# Patient Record
Sex: FEMALE | HISPANIC OR LATINO | Employment: OTHER | URBAN - METROPOLITAN AREA
[De-identification: names, ages, dates, MRNs, and addresses within clinical notes are randomized per-mention and may not be internally consistent; named-entity substitution may affect disease eponyms.]

---

## 2023-07-13 ENCOUNTER — APPOINTMENT (EMERGENCY)
Dept: RADIOLOGY | Facility: HOSPITAL | Age: 73
End: 2023-07-13
Payer: COMMERCIAL

## 2023-07-13 ENCOUNTER — HOSPITAL ENCOUNTER (EMERGENCY)
Facility: HOSPITAL | Age: 73
Discharge: HOME | End: 2023-07-13
Attending: EMERGENCY MEDICINE
Payer: COMMERCIAL

## 2023-07-13 VITALS
OXYGEN SATURATION: 100 % | HEART RATE: 105 BPM | DIASTOLIC BLOOD PRESSURE: 76 MMHG | RESPIRATION RATE: 15 BRPM | TEMPERATURE: 99.7 F | SYSTOLIC BLOOD PRESSURE: 161 MMHG
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
## 2023-07-13 DIAGNOSIS — S42.309A HUMERUS FRACTURE: ICD-10-CM

## 2023-07-13 PROCEDURE — 99283 EMERGENCY DEPT VISIT LOW MDM: CPT

## 2023-07-13 PROCEDURE — 73030 X-RAY EXAM OF SHOULDER: CPT

## 2023-07-13 RX ORDER — OXYCODONE HYDROCHLORIDE AND ACETAMINOPHEN 5; 325 MG/1; MG/1
1 TABLET ORAL ONCE
Status: COMPLETED | OUTPATIENT
Start: 2023-07-13 | End: 2023-07-13

## 2023-07-13 RX ORDER — ESOMEPRAZOLE MAGNESIUM 40 MG/1
40 CAPSULE, DELAYED RELEASE ORAL
COMMUNITY

## 2023-07-13 RX ORDER — LIDOCAINE 50 MG/G
1 PATCH TOPICAL ONCE
Status: DISCONTINUED | OUTPATIENT
Start: 2023-07-13 | End: 2023-07-13 | Stop reason: HOSPADM

## 2023-07-13 RX ORDER — RASAGILINE 0.5 MG/1
1 TABLET ORAL DAILY
COMMUNITY

## 2023-07-13 RX ORDER — GLIMEPIRIDE 2 MG/1
2 TABLET ORAL
COMMUNITY

## 2023-07-13 RX ORDER — CARBIDOPA/LEVODOPA 25MG-250MG
1 TABLET ORAL 3 TIMES DAILY
COMMUNITY

## 2023-07-13 RX ORDER — OXYCODONE HYDROCHLORIDE AND ACETAMINOPHEN 5; 325 MG/1; MG/1
1 TABLET ORAL EVERY 6 HOURS PRN
Qty: 20 TABLET | Refills: 0 | Status: SHIPPED | OUTPATIENT
Start: 2023-07-13 | End: 2023-07-23

## 2023-07-13 RX ADMIN — LIDOCAINE 1 PATCH: 50 PATCH CUTANEOUS at 19:47

## 2023-07-13 RX ADMIN — OXYCODONE AND ACETAMINOPHEN 1 TABLET: 5; 325 TABLET ORAL at 19:45

## 2023-07-13 NOTE — ED PROVIDER NOTES
History  Chief Complaint   Patient presents with   • Fall     Pt c/o of fall pt was trying to get into car around 1330 and fell on right shoulder pt heard a crack to R shoulders , (-) thinners,(-)headstrike. 4year-old female comes in for evaluation after fall. Patient states she was trying to get in a car earlier today onto her right shoulder. Patient states she heard a crack. Patient denies any head strike or loss of consciousness no blood thinners. Unable to move her arm. History provided by:  Patient and relative   used: Yes    Fall  Mechanism of injury: fall    Injury location:  Shoulder/arm  Shoulder/arm injury location:  R shoulder  Incident location:  Street  Arrived directly from scene: no    Fall:     Fall occurred:  Tripped and walking    Impact surface:  Tuttle    Point of impact: Right side. Entrapped after fall: no    Protective equipment: none    Suspicion of alcohol use: no    Suspicion of drug use: no    Tetanus status:  Up to date  Prior to arrival data:     Bystander interventions:  None    Patient ambulatory at scene: yes      Blood loss:  None    Responsiveness at scene:  Alert    Orientation at scene:  Person, place, situation and time    Loss of consciousness: no      Amnesic to event: no    Associated symptoms: no abdominal pain, no back pain, no chest pain, no seizures and no vomiting        Prior to Admission Medications   Prescriptions Last Dose Informant Patient Reported? Taking?    BIPERIDEN HCL PO 7/13/2023  Yes Yes   Sig: Take 40 mg by mouth   CHOLESTYRAMINE PO 7/13/2023  Yes Yes   Sig: Take by mouth   Ketoprofen POWD 7/13/2023  Yes Yes   Sig: Use   Nutritional Supplements (NUTRA BALANCE DIABETIC/FIBER PO) 7/13/2023  Yes Yes   Sig: Take by mouth   carbidopa-levodopa (SINEMET)  mg per tablet 7/13/2023  Yes Yes   Sig: Take 1 tablet by mouth 3 (three) times a day   esomeprazole (NexIUM) 40 MG capsule 7/13/2023  Yes Yes   Sig: Take 40 mg by mouth every morning before breakfast   glimepiride (AMARYL) 2 mg tablet 7/13/2023  Yes Yes   Sig: Take 2 mg by mouth every morning before breakfast   metFORMIN (GLUCOPHAGE) 1000 MG tablet 7/13/2023  Yes Yes   Sig: Take 1,000 mg by mouth 2 (two) times a day with meals   rasagiline (AZILECT) 0.5 mg 7/13/2023  Yes Yes   Sig: Take 1 mg by mouth daily      Facility-Administered Medications: None       Past Medical History:   Diagnosis Date   • Diabetes mellitus ([address])    • Parkinson's disease ([address])        Past Surgical History:   Procedure Laterality Date   • CHOLECYSTECTOMY     • HYSTERECTOMY         History reviewed. No pertinent family history. I have reviewed and agree with the history as documented. E-Cigarette/Vaping   • E-Cigarette Use Never User      E-Cigarette/Vaping Substances     Social History     Tobacco Use   • Smoking status: Never   • Smokeless tobacco: Never   Vaping Use   • Vaping Use: Never used   Substance Use Topics   • Alcohol use: Not Currently   • Drug use: Never       Review of Systems   Constitutional: Negative for chills and fever. HENT: Negative for ear pain and sore throat. Eyes: Negative for pain and visual disturbance. Respiratory: Negative for cough and shortness of breath. Cardiovascular: Negative for chest pain and palpitations. Gastrointestinal: Negative for abdominal pain and vomiting. Genitourinary: Negative for dysuria and hematuria. Musculoskeletal: Negative for arthralgias and back pain. Skin: Negative for color change and rash. Neurological: Negative for seizures and syncope. All other systems reviewed and are negative. Physical Exam  Physical Exam  Vitals and nursing note reviewed. Constitutional:       General: She is in acute distress. Appearance: She is well-developed. HENT:      Head: Normocephalic and atraumatic. Eyes:      Conjunctiva/sclera: Conjunctivae normal.   Cardiovascular:      Rate and Rhythm: Normal rate and regular rhythm. Heart sounds: No murmur heard. Pulmonary:      Effort: Pulmonary effort is normal. No respiratory distress. Breath sounds: Normal breath sounds. Abdominal:      Palpations: Abdomen is soft. Tenderness: There is no abdominal tenderness. Musculoskeletal:         General: No swelling. Right shoulder: Tenderness and bony tenderness present. Decreased range of motion. Decreased strength. Cervical back: Neck supple. Skin:     General: Skin is warm and dry. Capillary Refill: Capillary refill takes less than 2 seconds. Neurological:      Mental Status: She is alert. Psychiatric:         Mood and Affect: Mood normal.         Vital Signs  ED Triage Vitals   Temperature Pulse Respirations Blood Pressure SpO2   07/13/23 1926 07/13/23 1926 07/13/23 1926 07/13/23 1926 07/13/23 1926   99.7 °F (37.6 °C) 105 15 161/76 100 %      Temp Source Heart Rate Source Patient Position - Orthostatic VS BP Location FiO2 (%)   07/13/23 1926 07/13/23 1926 07/13/23 1926 07/13/23 1926 --   Oral Monitor Lying Left arm       Pain Score       07/13/23 1945       10 - Worst Possible Pain           Vitals:    07/13/23 1926   BP: 161/76   Pulse: 105   Patient Position - Orthostatic VS: Lying         Visual Acuity      ED Medications  Medications   oxyCODONE-acetaminophen (PERCOCET) 5-325 mg per tablet 1 tablet (1 tablet Oral Given 7/13/23 1945)       Diagnostic Studies  Results Reviewed     None                 XR shoulder 2+ views RIGHT    (Results Pending)              Procedures  Procedures         ED Course                                             Medical Decision Making  Darrian diagnosis includes but is not limited to shoulder strain, shoulder contusion shoulder fracture clavicle fracture    Fall, initial encounter: acute illness or injury  Humerus fracture: acute illness or injury  Amount and/or Complexity of Data Reviewed  Radiology: ordered.      Details: Patient has a proximal humerus fracture      Risk  OTC drugs.  Prescription drug management. Risk Details: Discussed with patient they may take Motrin or Tylenol as needed for pain should continue all current medications. I also added an oxycodone. She is to follow-up with orthopedics        Disposition  Final diagnoses:   Fall, initial encounter   Humerus fracture     Time reflects when diagnosis was documented in both MDM as applicable and the Disposition within this note     Time User Action Codes Description Comment    7/13/2023  7:47 PM Valeria Myrick Add [L15. CODV] Fall, initial encounter     7/13/2023  7:47 PM Valeria Myrick Add [J87.673N] Humerus fracture       ED Disposition     ED Disposition   Discharge    Condition   Stable    Date/Time   Thu Jul 13, 2023  7:46 PM    Comment   Jerry Coombs discharge to home/self care.                Follow-up Information     Follow up With Specialties Details Why Contact Info Additional [address] [facility] Orthopedic Surgery Schedule an appointment as soon as possible for a visit   [address] [address] [address] [facility] [address] [facility] [address] [address] [address] [facility] [phone]    [address] [place] [address] Schedule an appointment as soon as possible for a visit   [address] [facility] [phone]  [address], [place], [place], [address]          Discharge Medication List as of 7/13/2023  7:48 PM      START taking these medications    Details   oxyCODONE-acetaminophen (PERCOCET) 5-325 mg per tablet Take 1 tablet by mouth every 6 (six) hours as needed for moderate pain (May use up to two tablets every 6 hours.) for up to 10 days Max Daily Amount: 4 tablets, Starting Thu 7/13/2023, Until Sun 7/23/2023 at 2359, Normal         CONTINUE these medications which have NOT CHANGED    Details   BIPERIDEN HCL PO Take 40 mg by mouth, Historical Med      carbidopa-levodopa (SINEMET)  mg per tablet Take 1 tablet by mouth 3 (three) times a day, Historical Med      CHOLESTYRAMINE PO Take by mouth, Historical Med      esomeprazole (NexIUM) 40 MG capsule Take 40 mg by mouth every morning before breakfast, Historical Med      glimepiride (AMARYL) 2 mg tablet Take 2 mg by mouth every morning before breakfast, Historical Med      Ketoprofen POWD Use, Historical Med      metFORMIN (GLUCOPHAGE) 1000 MG tablet Take 1,000 mg by mouth 2 (two) times a day with meals, Historical Med      Nutritional Supplements (NUTRA BALANCE DIABETIC/FIBER PO) Take by mouth, Historical Med      rasagiline (AZILECT) 0.5 mg Take 1 mg by mouth daily, Historical Med                 PDMP Review     None          ED Provider  Electronically Signed by           Francine Abdi DO  07/14/23 0015

## 2023-07-20 ENCOUNTER — APPOINTMENT (OUTPATIENT)
Dept: RADIOLOGY | Facility: OTHER | Age: 73
End: 2023-07-20

## 2023-07-20 ENCOUNTER — OFFICE VISIT (OUTPATIENT)
Dept: OBGYN CLINIC | Facility: OTHER | Age: 73
End: 2023-07-20

## 2023-07-20 VITALS
BODY MASS INDEX: 24.75 KG/M2 | DIASTOLIC BLOOD PRESSURE: 81 MMHG | HEIGHT: 64 IN | SYSTOLIC BLOOD PRESSURE: 136 MMHG | HEART RATE: 99 BPM | WEIGHT: 145 LBS

## 2023-07-20 DIAGNOSIS — M25.511 ACUTE PAIN OF RIGHT SHOULDER: Primary | ICD-10-CM

## 2023-07-20 DIAGNOSIS — S42.294A OTHER CLOSED NONDISPLACED FRACTURE OF PROXIMAL END OF RIGHT HUMERUS, INITIAL ENCOUNTER: ICD-10-CM

## 2023-07-20 DIAGNOSIS — Z78.9 NON-ENGLISH SPEAKING PATIENT: ICD-10-CM

## 2023-07-20 DIAGNOSIS — Z59.89 DOES NOT HAVE HEALTH INSURANCE: ICD-10-CM

## 2023-07-20 DIAGNOSIS — M25.511 ACUTE PAIN OF RIGHT SHOULDER: ICD-10-CM

## 2023-07-20 PROCEDURE — 73030 X-RAY EXAM OF SHOULDER: CPT

## 2023-07-20 PROCEDURE — 99203 OFFICE O/P NEW LOW 30 MIN: CPT | Performed by: PHYSICIAN ASSISTANT

## 2023-07-20 SDOH — ECONOMIC STABILITY - INCOME SECURITY: OTHER PROBLEMS RELATED TO HOUSING AND ECONOMIC CIRCUMSTANCES: Z59.89

## 2023-07-20 NOTE — PROGRESS NOTES
Orthopaedic Surgery - Office Note  Garrett Currie (68 y.o. female)   : 1950   MRN: [id number]  Encounter Date: 2023    Chief Complaint   Patient presents with   • Right Arm - Pain         Assessment/Plan  Diagnoses and all orders for this visit:    Acute pain of right shoulder  -     XR shoulder 2+ vw right; Future    Closed nondisplaced transversely oriented fracture of the proximal humerus at the surgical neck of right humerus, initial encounter  -     XR shoulder 2+ vw right; Future    Does not have health insurance    Non-English speaking patient    The diagnosis as well as treatment options were reviewed with the patient in the office today. This fracture should heal without surgical intervention. There is a risk of loss of motion. Icing the shoulder 20 minutes on 1 hour off 3 times a day will help with pain and inflammation. Patient should continue to utilize the sling. Physical therapy will be required in the future, but not currently. Skin care precautions were encouraged. Ibuprofen with food may be used for pain and Tylenol may be utilized for breakthrough pain control. Patient will follow strict diabetic control to improve outcomes and decrease risk of complications. Patient was encouraged to work on hand pumps and gentle elbow exercises for range of motion and edema control. Return for Recheck in 7-10 days with Dr. Domenico Bautista. History of Present Illness  This is a new patient with right shoulder pain after falling getting into her car on 2023. Patient had immediate pain and discomfort and heard an audible crack. Patient presented to the emergency room was noted to have a fracture. Patient has been utilizing the sling without severe pain. No paresthesias are reported. Been using ibuprofen with good relief of pain. Patient is here visiting family from [place] and will be returning to that country 2023.   Patient does not wish to consider any surgery at this time. She is here today with her family who provides translation at the patient's request    Patient has a contributing medical history of diabetes and Parkinson's. Review of Systems  Pertinent items are noted in HPI. All other systems were reviewed and are negative. Physical Exam  /81 (BP Location: Left arm, Patient Position: Sitting, Cuff Size: Standard)   Pulse 99   Ht 5' 4" (1.626 m)   Wt 65.8 kg (145 lb)   BMI 24.89 kg/m²   Cons: Appears well. No apparent distress. Psych: Alert. Oriented x3. Mood and affect normal.  Shoulder has no skin breakdown lesions or signs of infection. Sensations intact to light touch in the deltoid. She is able to extend her right wrist.  Minimal soft tissue edema is appreciated in the right upper extremity. Resolving ecchymosis is noted. Patient is neurovascular intact in the right upper extremity. Range of motion and strength not assessed due to known fracture. X-rays performed today 3 views of the right shoulder do show a minimally displaced transverse fracture through the surgical neck of the proximal humerus with slight displacement. X-rays are minimally changed from previous films 7 days ago. X-rays were read from orthopedic standpoint will await official radiologist interpretation        Studies Reviewed  Study Result    Narrative & Impression   RIGHT SHOULDER     INDICATION:   fall.     COMPARISON:  None     VIEWS:  XR SHOULDER 2+ VW RIGHT        FINDINGS:     Transversely oriented fracture of the proximal humerus at the surgical neck. There is no displacement.  No dislocation.     No significant degenerative changes.     No lytic or blastic osseous lesion.     Soft tissues are unremarkable.     IMPRESSION:     Proximal humeral fracture.     Workstation performed: [id number]        X-ray images as well as reports were reviewed by myself in the office today and I agree with radiologist interpretation  ER notes from 7/13/2023 were reviewed by myself in the office today    Procedures  No procedures today. Medical, Surgical, Family, and Social History  The patient's medical history, family history, and social history, were reviewed and updated as appropriate. Past Medical History:   Diagnosis Date   • Diabetes mellitus ([address])    • Parkinson's disease ([address])        Past Surgical History:   Procedure Laterality Date   • CHOLECYSTECTOMY     • HYSTERECTOMY         History reviewed. No pertinent family history.     Social History     Occupational History   • Not on file   Tobacco Use   • Smoking status: Never   • Smokeless tobacco: Never   Vaping Use   • Vaping Use: Never used   Substance and Sexual Activity   • Alcohol use: Not Currently   • Drug use: Never   • Sexual activity: Not on file       No Known Allergies      Current Outpatient Medications:   •  BIPERIDEN HCL PO, Take 40 mg by mouth, Disp: , Rfl:   •  carbidopa-levodopa (SINEMET)  mg per tablet, Take 1 tablet by mouth 3 (three) times a day, Disp: , Rfl:   •  CHOLESTYRAMINE PO, Take by mouth, Disp: , Rfl:   •  esomeprazole (NexIUM) 40 MG capsule, Take 40 mg by mouth every morning before breakfast, Disp: , Rfl:   •  glimepiride (AMARYL) 2 mg tablet, Take 2 mg by mouth every morning before breakfast, Disp: , Rfl:   •  Ketoprofen POWD, Use, Disp: , Rfl:   •  metFORMIN (GLUCOPHAGE) 1000 MG tablet, Take 1,000 mg by mouth 2 (two) times a day with meals, Disp: , Rfl:   •  Nutritional Supplements (NUTRA BALANCE DIABETIC/FIBER PO), Take by mouth, Disp: , Rfl:   •  oxyCODONE-acetaminophen (PERCOCET) 5-325 mg per tablet, Take 1 tablet by mouth every 6 (six) hours as needed for moderate pain (May use up to two tablets every 6 hours.) for up to 10 days Max Daily Amount: 4 tablets, Disp: 20 tablet, Rfl: 0  •  rasagiline (AZILECT) 0.5 mg, Take 1 mg by mouth daily, Disp: , Rfl:       [employer]z [employer], PA-C

## 2023-07-20 NOTE — PATIENT INSTRUCTIONS
Cómo usar un cabestrillo   CUIDADO AMBULATORIO:   Un cabestrillo es un ribeiro lily de irasema que cuelga de pozo katie para apoyar pozo Mu Minion. Pozo brazo y la parte donde dobla el codo descansan en el cabestrillo. Algunos cabestrillos tienen nidhi moise que baja por la espalda para que pozo katie no tenga que soportar [facility]. Esta moise se conecta al lado del codo del cabestrillo. Pozo médico le ayudará a decidir cuál cabestrillo es mejor para usted y dónde puede conseguir samuel. El cabestrillo ayuda a prevenir que pozo mano, Mu Minion y hombro se muevan para que así pozo lesión pueda sanar. El cabestrillo también puede ayudar si usted tiene un yeso pesado en el brazo. Por qué usted podría necesitar un cabestrillo: Usted puede necesitar un cabestrillo debido a nidhi lesión o cirugía en pozo mano, danuta, brazo u hombro. El cabestrillo ayuda a prevenir que pozo mano, Mu Minion y hombro se muevan para que así pozo lesión pueda sanar. El cabestrillo también puede ayudar si usted tiene un yeso pesado en el brazo. Llame a pozo médico u ortopedista si:  Arnoldo Span, entumecimiento, dolor o color pálido en el brazo, la mano o los dedos. Terressa Livier a tener dolor en el katie o el hombro. Tiene preguntas acerca de cómo utilizar el cabestrillo. Cómo usar un cabestrillo: Pozo médico le enseñará cómo ponerse el cabestrillo. Ashok Hero son pautas generales que le ayudarán a recordar lo que le enseñe pozo médico:  Doble cuidadosamente el brazo lesionado y sosténgalo en frente suyo, al frente de pozo cuerpo. El dedo pulgar (dedo anitra) debe quedar apuntando Philip Warren arriba. Descanse pozo brazo dentro del cabestrillo de Kai que el codo quede en el extremo cerrado. La mano debe quedar en el extremo abierto. Coloque el borde del cabestrillo de manera que cubra el primer doblez del dedo meñique. Pase la moise por detrás de pozo katie. La moise jerod siempre tiene un pedazo de material adhesivo para sujetarla fácilmente.  Debe jennifer un espacio de 2 dedos entre la moise y el katie. Mueva los dedos según le indiquen para prevenir la rigidez. Acuda a lloyd consultas de control con lopez médico u ortopedista según le indicaron: Rom vez deba hacerse pruebas para controlar si la lesión está sanando. Es posible que necesite que le ajusten el cabestrillo o que lo cambien por otro tipo de cabestrillo. Anote lloyd preguntas para que se acuerde de hacerlas sherie lloyd visitas. © Copyright [employer] 2022 Information is for End User's use only and may not be sold, redistributed or otherwise used for commercial purposes. Esta información es sólo para uso en educación. Lopez intención no es darle un consejo médico sobre enfermedades o tratamientos. Colsulte con lopez Nayeli Majestic farmacéutico antes de seguir cualquier régimen médico para saber si es seguro y efectivo para usted.

## 2023-07-31 ENCOUNTER — CONSULT (OUTPATIENT)
Dept: OBGYN CLINIC | Facility: OTHER | Age: 73
End: 2023-07-31

## 2023-07-31 ENCOUNTER — APPOINTMENT (OUTPATIENT)
Dept: RADIOLOGY | Facility: OTHER | Age: 73
End: 2023-07-31

## 2023-07-31 VITALS
HEART RATE: 109 BPM | BODY MASS INDEX: 24.16 KG/M2 | DIASTOLIC BLOOD PRESSURE: 80 MMHG | SYSTOLIC BLOOD PRESSURE: 116 MMHG | HEIGHT: 65 IN | WEIGHT: 145 LBS

## 2023-07-31 DIAGNOSIS — M25.511 RIGHT SHOULDER PAIN, UNSPECIFIED CHRONICITY: ICD-10-CM

## 2023-07-31 DIAGNOSIS — M25.511 RIGHT SHOULDER PAIN, UNSPECIFIED CHRONICITY: Primary | ICD-10-CM

## 2023-07-31 DIAGNOSIS — S42.294A OTHER CLOSED NONDISPLACED FRACTURE OF PROXIMAL END OF RIGHT HUMERUS, INITIAL ENCOUNTER: ICD-10-CM

## 2023-07-31 PROBLEM — S42.201A CLOSED FRACTURE OF RIGHT PROXIMAL HUMERUS: Status: ACTIVE | Noted: 2023-07-31

## 2023-07-31 PROCEDURE — 73030 X-RAY EXAM OF SHOULDER: CPT

## 2023-07-31 PROCEDURE — 99204 OFFICE O/P NEW MOD 45 MIN: CPT | Performed by: ORTHOPAEDIC SURGERY

## 2023-07-31 NOTE — PROGRESS NOTES
Assessment  Diagnoses and all orders for this visit:    Other closed nondisplaced fracture of proximal end of right humerus, initial encounter          Discussion and Plan:    Reviewed physical exam and imaging with patient at time of visit. The patient is 2.5 weeks s/p proximal humerus fracture of her right shoulder. Radiographic findings demonstrate healing proximal humerus fracture. She was provided an Arc 2.0 sling in office today. Recommend that she remove the sling to complete range of motion activities for her elbow and wrist. The patient will be traveling to [place] next week and requested a copy of her imaging. Recommended that she begin physical therapy to begin gentle range of motion as tolerated. She will follow-up, as needed. The patient expresses understanding and is in agreement with today's treatment plan. Subjective:   Patient ID: Bayron Jain is a 68 y.o. female      HPI  The patient presents with a chief complaint of right shoulder pain. The patient is 2.5 s/p proximal humeral fracture. She experienced a fall on 7/13/23. She reported to her local ED for evaluation and imaging which revealed a proximal humerus fracture. The patient describes the pain as sharp in intensity,  constant in timing, and localizes the pain to the  Proximal humerus. She is concerned with the bruising. The pain is worse with movement and relieved by rest and OTC NSAIDs. She reports taking advil and tylenol routinely around the clock. The pain is associated with occasional numbness and tingling. The pain is not associated with constitutional symptoms. The patient is awoken at night by the pain. She has been compliant with wearing the sling.        The following portions of the patient's history were reviewed and updated as appropriate: allergies, current medications, past family history, past medical history, past social history, past surgical history and problem list.    Review of Systems Constitutional: Negative for chills, fever and unexpected weight change. HENT: Negative for hearing loss, nosebleeds and sore throat. Eyes: Negative for pain, redness and visual disturbance. Respiratory: Negative for cough, shortness of breath and wheezing. Cardiovascular: Negative for chest pain, palpitations and leg swelling. Gastrointestinal: Negative for abdominal pain, nausea and vomiting. Endocrine: Negative for polydipsia and polyuria. Genitourinary: Negative for dysuria and hematuria. Skin: Negative for rash and wound. Neurological: Negative for dizziness, numbness and headaches. Psychiatric/Behavioral: Negative for decreased concentration and suicidal ideas. The patient is not nervous/anxious. All other systems reviewed and are negative. Objective:  /80 (BP Location: Left arm, Patient Position: Sitting, Cuff Size: Adult)   Pulse (!) 109   Ht 5' 5" (1.651 m) Comment: verbal  Wt 65.8 kg (145 lb)   BMI 24.13 kg/m²       Right Shoulder Exam     Tenderness   Right shoulder tenderness location: proximal humerus. Other   Erythema: absent  Sensation: normal  Pulse: present        Ecchymosis present     Physical Exam  Constitutional:       Appearance: Normal appearance. HENT:      Head: Normocephalic. Nose: Nose normal.   Eyes:      Pupils: Pupils are equal, round, and reactive to light. Cardiovascular:      Rate and Rhythm: Normal rate and regular rhythm. Pulses: Normal pulses. Heart sounds: Normal heart sounds. Pulmonary:      Effort: Pulmonary effort is normal.      Breath sounds: Normal breath sounds. Musculoskeletal:      Cervical back: Normal range of motion. Skin:     General: Skin is warm and dry. Capillary Refill: Capillary refill takes less than 2 seconds. Neurological:      Mental Status: She is alert and oriented to person, place, and time.    Psychiatric:         Mood and Affect: Mood normal.         Behavior: Behavior normal. I have personally reviewed pertinent films in PACS and my interpretation is as follows. X-Ray of right shoulder taken on 7/20/23 were reviewed and showed healing humerus fracture. There is increased bridging bone across fracture site with maintained anatomical alignment.       Scribe Attestation    I,:  Martin Irwin am acting as a scribe while in the presence of the attending physician.:       I,:  Dave Adam MD personally performed the services described in this documentation    as scribed in my presence.: